# Patient Record
Sex: FEMALE | Race: WHITE | URBAN - METROPOLITAN AREA
[De-identification: names, ages, dates, MRNs, and addresses within clinical notes are randomized per-mention and may not be internally consistent; named-entity substitution may affect disease eponyms.]

---

## 2020-03-03 ENCOUNTER — OFFICE VISIT (OUTPATIENT)
Dept: URBAN - METROPOLITAN AREA CLINIC 71 | Facility: CLINIC | Age: 80
End: 2020-03-03
Payer: MEDICARE

## 2020-03-03 PROCEDURE — 99213 OFFICE O/P EST LOW 20 MIN: CPT | Performed by: OPHTHALMOLOGY

## 2020-03-03 PROCEDURE — 92134 CPTRZ OPH DX IMG PST SGM RTA: CPT | Performed by: OPHTHALMOLOGY

## 2020-03-03 ASSESSMENT — VISUAL ACUITY
OD: 20/30
OS: 20/30

## 2020-03-03 ASSESSMENT — INTRAOCULAR PRESSURE
OD: 17
OS: 16

## 2020-03-03 NOTE — IMPRESSION/PLAN
Impression: Nexdtve age-related mclr degn, bilateral, early dry stage: H35.3131.  Plan: con to use eye vitamins

## 2020-03-03 NOTE — IMPRESSION/PLAN
Impression: Age-related nuclear cataract, bilateral: H25.13. Plan: early signs of cataracts- has not changed much will con to monitor will r/c in 6 mons make sure vision does not drop off. pt would like to wait as well.

## 2020-09-03 ENCOUNTER — OFFICE VISIT (OUTPATIENT)
Dept: URBAN - METROPOLITAN AREA CLINIC 71 | Facility: CLINIC | Age: 80
End: 2020-09-03
Payer: MEDICARE

## 2020-09-03 DIAGNOSIS — H25.13 AGE-RELATED NUCLEAR CATARACT, BILATERAL: ICD-10-CM

## 2020-09-03 PROCEDURE — 92133 CPTRZD OPH DX IMG PST SGM ON: CPT | Performed by: OPHTHALMOLOGY

## 2020-09-03 PROCEDURE — 92014 COMPRE OPH EXAM EST PT 1/>: CPT | Performed by: OPHTHALMOLOGY

## 2020-09-03 PROCEDURE — 92134 CPTRZ OPH DX IMG PST SGM RTA: CPT | Performed by: OPHTHALMOLOGY

## 2020-09-03 ASSESSMENT — KERATOMETRY
OS: 43.63
OD: 42.88

## 2020-09-03 ASSESSMENT — INTRAOCULAR PRESSURE
OD: 13
OS: 12

## 2020-09-03 NOTE — IMPRESSION/PLAN
Impression: Nexdtve age-related mclr degn, bilateral, early dry stage: H35.3131. OCT ordered. Stable. Plan: No signs of leakage. The clinical exam and OCT are stable without evidence of progression. Will continue to monitor on a 6 months basis.

## 2020-09-03 NOTE — IMPRESSION/PLAN
Impression: Age-related nuclear cataract, bilateral: H25.13. Stable. Plan: No treatment currently recommended. The patient will monitor vision changes and contact us with any decrease in vision.

## 2021-03-23 ENCOUNTER — OFFICE VISIT (OUTPATIENT)
Dept: URBAN - METROPOLITAN AREA CLINIC 71 | Facility: CLINIC | Age: 81
End: 2021-03-23
Payer: MEDICARE

## 2021-03-23 DIAGNOSIS — H35.3131 NEXDTVE AGE-RELATED MCLR DEGN, BILATERAL, EARLY DRY STAGE: Primary | ICD-10-CM

## 2021-03-23 PROCEDURE — 99213 OFFICE O/P EST LOW 20 MIN: CPT | Performed by: OPHTHALMOLOGY

## 2021-03-23 PROCEDURE — 92134 CPTRZ OPH DX IMG PST SGM RTA: CPT | Performed by: OPHTHALMOLOGY

## 2021-03-23 ASSESSMENT — INTRAOCULAR PRESSURE
OS: 17
OD: 15

## 2021-03-23 NOTE — IMPRESSION/PLAN
Impression: Vitreous degeneration, bilateral: H43.813. Stable. No holes or tears seen today. Plan: Continue to monitor.

## 2021-03-23 NOTE — IMPRESSION/PLAN
Impression: Nexdtve age-related mclr degn, bilateral, early dry stage: H35.3131. OCT ordered today and reviewed. Plan: The clinical exam and OCT are stable without evidence of progression. The patient was advised to continue AREDs multivitamins. The patient was also advised to continue to monitor the Amsler grid, and call us immediately with any visual changes. Return in 6 months for dilation and OCT testing.

## 2021-07-15 ENCOUNTER — OFFICE VISIT (OUTPATIENT)
Dept: URBAN - METROPOLITAN AREA CLINIC 71 | Facility: CLINIC | Age: 81
End: 2021-07-15
Payer: COMMERCIAL

## 2021-07-15 DIAGNOSIS — H52.4 PRESBYOPIA: Primary | ICD-10-CM

## 2021-07-15 PROCEDURE — 92014 COMPRE OPH EXAM EST PT 1/>: CPT | Performed by: OPTOMETRIST

## 2021-07-15 ASSESSMENT — INTRAOCULAR PRESSURE
OS: 15
OD: 15

## 2021-07-15 ASSESSMENT — VISUAL ACUITY
OS: 20/40
OD: 20/40

## 2021-07-15 NOTE — IMPRESSION/PLAN
Impression: Presbyopia: H52.4. Plan: Presbyopia is the inability to focus on objects (ie: accommodate) due to the loss of flexibility of your natural lens. Presbyopia occurs with age. Reading glasses, bifocals, trifocals or contacts can be helpful. Contact the office if difficulty focusing persists despite corrective eye wear. No recommendation for glasses today. Updating the RX does not improve her vision better than 20/40 which is what she is seeing without correction today. Continue using the OTC readers and Follow with Dr. Onesimo Dean as planned.

## 2021-09-21 ENCOUNTER — OFFICE VISIT (OUTPATIENT)
Dept: URBAN - METROPOLITAN AREA CLINIC 71 | Facility: CLINIC | Age: 81
End: 2021-09-21
Payer: MEDICARE

## 2021-09-21 DIAGNOSIS — H43.813 VITREOUS DEGENERATION, BILATERAL: ICD-10-CM

## 2021-09-21 PROCEDURE — 92134 CPTRZ OPH DX IMG PST SGM RTA: CPT | Performed by: OPHTHALMOLOGY

## 2021-09-21 PROCEDURE — 99213 OFFICE O/P EST LOW 20 MIN: CPT | Performed by: OPHTHALMOLOGY

## 2021-09-21 ASSESSMENT — INTRAOCULAR PRESSURE
OS: 20
OD: 20

## 2021-09-21 ASSESSMENT — KERATOMETRY
OS: 43.63
OD: 43.13

## 2021-09-21 NOTE — IMPRESSION/PLAN
Impression: Dry eye syndrome of bilateral lacrimal glands: H04.123. Discussed diagnosis with patient. Plan: Recommend use of artificial tears as needed, especially while reading or on the computer. Patient is okay to continue use of Zaditor as needed for allergy symptoms.

## 2021-09-21 NOTE — IMPRESSION/PLAN
Impression: Nexdtve age-related mclr degn, bilateral, early dry stage: H35.3131. OCT ordered today and reviewed. No leakage or fluid seen today. Stable. Plan: The clinical exam and OCT are stable without evidence of progression. The patient was advised to continue AREDs multivitamins. The patient was also advised to continue to monitor the Amsler grid, and call us immediately with any visual changes. Return in 6 months for dilation and OCT testing.

## 2022-03-22 ENCOUNTER — OFFICE VISIT (OUTPATIENT)
Dept: URBAN - METROPOLITAN AREA CLINIC 71 | Facility: CLINIC | Age: 82
End: 2022-03-22
Payer: MEDICARE

## 2022-03-22 DIAGNOSIS — H35.3132 BILATERAL NONEXUDATIVE AGE-RELATED MACULAR DEGENERATION, INTERMEDIATE DRY STAGE: Primary | ICD-10-CM

## 2022-03-22 DIAGNOSIS — H04.123 DRY EYE SYNDROME OF BILATERAL LACRIMAL GLANDS: ICD-10-CM

## 2022-03-22 PROCEDURE — 99213 OFFICE O/P EST LOW 20 MIN: CPT | Performed by: OPHTHALMOLOGY

## 2022-03-22 PROCEDURE — 92134 CPTRZ OPH DX IMG PST SGM RTA: CPT | Performed by: OPHTHALMOLOGY

## 2022-03-22 ASSESSMENT — VISUAL ACUITY
OS: 20/40
OD: 20/40

## 2022-03-22 ASSESSMENT — INTRAOCULAR PRESSURE
OD: 19
OS: 15

## 2022-03-22 NOTE — IMPRESSION/PLAN
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 OCT ordered today and reviewed. No leakage seen today, but there is progression. Discussed with patient. Plan: Recommend use of ocular vitamins and monitoring of the amsler grid. Patient to call if any changes in vision or distortions occur. Return in 6 months for dilation and repeat OCT testing. Due to the progression of AMD, we will refer patient to a retinal specialist for evaluation.

## 2022-03-22 NOTE — IMPRESSION/PLAN
Impression: Dry eye syndrome of bilateral lacrimal glands: H04.123. Discussed diagnosis with patient. Plan: Recommend use of artificial tears as needed, especially while reading or on the computer.

## 2022-04-08 ENCOUNTER — OFFICE VISIT (OUTPATIENT)
Dept: URBAN - METROPOLITAN AREA CLINIC 70 | Facility: CLINIC | Age: 82
End: 2022-04-08
Payer: MEDICARE

## 2022-04-08 PROCEDURE — 99204 OFFICE O/P NEW MOD 45 MIN: CPT | Performed by: OPHTHALMOLOGY

## 2022-04-08 PROCEDURE — 92134 CPTRZ OPH DX IMG PST SGM RTA: CPT | Performed by: OPHTHALMOLOGY

## 2022-04-08 PROCEDURE — 92242 FLUORESCEIN&ICG ANGIOGRAPHY: CPT | Performed by: OPHTHALMOLOGY

## 2022-04-08 ASSESSMENT — INTRAOCULAR PRESSURE
OD: 17
OS: 15

## 2022-04-08 NOTE — IMPRESSION/PLAN
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 Plan: Exam, OCT, and ICG/FA reveal dry AMD. Discussed eye vitamins. Recommend AREDS-II, BID. Discussed blood pressure and tobacco usage.

## 2022-04-08 NOTE — IMPRESSION/PLAN
Impression: Age-related nuclear cataract, bilateral: H25.13.  Plan: okay to proceed with Cataract removal

## 2022-09-26 ENCOUNTER — OFFICE VISIT (OUTPATIENT)
Dept: URBAN - METROPOLITAN AREA CLINIC 71 | Facility: CLINIC | Age: 82
End: 2022-09-26
Payer: MEDICARE

## 2022-09-26 DIAGNOSIS — H35.3132 BILATERAL NONEXUDATIVE AGE-RELATED MACULAR DEGENERATION, INTERMEDIATE DRY STAGE: Primary | ICD-10-CM

## 2022-09-26 DIAGNOSIS — H25.13 AGE-RELATED NUCLEAR CATARACT, BILATERAL: ICD-10-CM

## 2022-09-26 DIAGNOSIS — H43.813 VITREOUS DEGENERATION, BILATERAL: ICD-10-CM

## 2022-09-26 PROCEDURE — 99213 OFFICE O/P EST LOW 20 MIN: CPT | Performed by: OPHTHALMOLOGY

## 2022-09-26 PROCEDURE — 92134 CPTRZ OPH DX IMG PST SGM RTA: CPT | Performed by: OPHTHALMOLOGY

## 2022-09-26 ASSESSMENT — INTRAOCULAR PRESSURE
OS: 12
OD: 10

## 2022-09-26 NOTE — IMPRESSION/PLAN
Impression: Age-related nuclear cataract, bilateral: H25.13. Stable. Patient does feel as though her vision is bothered by the cataracts at this time. Plan: No treatment currently recommended. The patient will monitor vision changes and contact us with any decrease in vision.

## 2022-09-26 NOTE — IMPRESSION/PLAN
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 OCT ordered today and reviewed. OCT and physical exam are remaining stable with no signs of leakage. Plan: Recommend use of ocular vitamins and monitoring of the amsler grid. Patient to call if any changes in vision or distortions occur. Return in 6 months for dilation and repeat OCT testing. Keep appointments as scheduled with Dr. Chana Stark.

## 2022-10-07 ENCOUNTER — OFFICE VISIT (OUTPATIENT)
Dept: URBAN - METROPOLITAN AREA CLINIC 70 | Facility: CLINIC | Age: 82
End: 2022-10-07
Payer: MEDICARE

## 2022-10-07 DIAGNOSIS — H25.13 AGE-RELATED NUCLEAR CATARACT, BILATERAL: ICD-10-CM

## 2022-10-07 DIAGNOSIS — H35.3132 BILATERAL NONEXUDATIVE AGE-RELATED MACULAR DEGENERATION, INTERMEDIATE DRY STAGE: Primary | ICD-10-CM

## 2022-10-07 PROCEDURE — 92242 FLUORESCEIN&ICG ANGIOGRAPHY: CPT | Performed by: OPHTHALMOLOGY

## 2022-10-07 PROCEDURE — 99212 OFFICE O/P EST SF 10 MIN: CPT | Performed by: OPHTHALMOLOGY

## 2022-10-07 PROCEDURE — 92134 CPTRZ OPH DX IMG PST SGM RTA: CPT | Performed by: OPHTHALMOLOGY

## 2022-10-07 ASSESSMENT — INTRAOCULAR PRESSURE
OS: 13
OD: 13

## 2023-03-27 ENCOUNTER — OFFICE VISIT (OUTPATIENT)
Dept: URBAN - METROPOLITAN AREA CLINIC 71 | Facility: CLINIC | Age: 83
End: 2023-03-27
Payer: MEDICARE

## 2023-03-27 DIAGNOSIS — H43.813 VITREOUS DEGENERATION, BILATERAL: ICD-10-CM

## 2023-03-27 DIAGNOSIS — H25.813 COMBINED FORMS OF AGE-RELATED CATARACT, BILATERAL: ICD-10-CM

## 2023-03-27 DIAGNOSIS — H35.3132 NONEXUDATIVE AGE-RELATED MACULAR DEGENERATION, BILATERAL, INTERMEDIATE DRY STAGE: Primary | ICD-10-CM

## 2023-03-27 PROCEDURE — 92134 CPTRZ OPH DX IMG PST SGM RTA: CPT | Performed by: OPHTHALMOLOGY

## 2023-03-27 PROCEDURE — 99213 OFFICE O/P EST LOW 20 MIN: CPT | Performed by: OPHTHALMOLOGY

## 2023-03-27 ASSESSMENT — VISUAL ACUITY
OD: 20/50
OS: 20/60

## 2023-03-27 ASSESSMENT — INTRAOCULAR PRESSURE
OD: 15
OS: 14

## 2023-03-27 NOTE — IMPRESSION/PLAN
Impression: Vitreous degeneration, bilateral: H43.813. Stable. No holes or tears seen today. Retina flat OU. Plan: Continue to monitor.

## 2023-03-27 NOTE — IMPRESSION/PLAN
Impression: Combined forms of age-related cataract, bilateral: H25.813. Cataracts are contributing to the patient's complaints of decreased vision OU. PT advised if we do the left eye and the right eye is not bothering her she can hold off on CAT SX on the right eye. Plan:  Discussed all risks, benefits, procedures and recovery. Patient understands changing glasses will not improve vision. Patient desires to have surgery, recommend phacoemulsification with intraocular lens.  OS first

## 2023-03-27 NOTE — IMPRESSION/PLAN
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 OCT ordered today and reviewed. OCT and exam are remaining stable with no signs of leakage. Plan: Recommend continued use of ocular vitamins and monitoring of the amsler grid. Patient to call if any changes in vision or distortions occur. Return in 6 months for dilation and repeat OCT testing. Keep appointments as scheduled with Dr. Ricardo Pike.

## 2023-05-15 ENCOUNTER — PRE-OPERATIVE VISIT (OUTPATIENT)
Dept: URBAN - METROPOLITAN AREA CLINIC 71 | Facility: CLINIC | Age: 83
End: 2023-05-15
Payer: MEDICARE

## 2023-05-15 DIAGNOSIS — H25.813 COMBINED FORMS OF AGE-RELATED CATARACT, BILATERAL: Primary | ICD-10-CM

## 2023-06-20 ENCOUNTER — PRE-OPERATIVE VISIT (OUTPATIENT)
Dept: URBAN - METROPOLITAN AREA CLINIC 71 | Facility: CLINIC | Age: 83
End: 2023-06-20
Payer: MEDICARE

## 2023-06-20 DIAGNOSIS — H35.3132 BILATERAL NONEXUDATIVE AGE-RELATED MACULAR DEGENERATION, INTERMEDIATE DRY STAGE: ICD-10-CM

## 2023-06-20 DIAGNOSIS — H25.813 COMBINED FORMS OF AGE-RELATED CATARACT, BILATERAL: Primary | ICD-10-CM

## 2023-06-20 PROCEDURE — 76519 ECHO EXAM OF EYE: CPT | Performed by: OPHTHALMOLOGY

## 2023-06-20 PROCEDURE — 99213 OFFICE O/P EST LOW 20 MIN: CPT | Performed by: OPHTHALMOLOGY

## 2023-06-20 RX ORDER — KETOROLAC TROMETHAMINE 5 MG/ML
0.5 % SOLUTION OPHTHALMIC
Qty: 5 | Refills: 0 | Status: ACTIVE
Start: 2023-06-20

## 2023-06-20 ASSESSMENT — VISUAL ACUITY
OS: 20/60
OD: 20/50

## 2023-06-20 ASSESSMENT — INTRAOCULAR PRESSURE
OD: 20
OS: 16

## 2023-06-20 NOTE — IMPRESSION/PLAN
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 OPTOS ordered and reviewed today. No leakage noted on imaging. Plan: PT warned that the AMD may limit the visual outcome of the cataract surgery OS. Continue to monitor.

## 2023-06-20 NOTE — IMPRESSION/PLAN
Impression: Combined forms of age-related cataract, bilateral: H25.813. Plan: Consent sheet was reviewed with the patient. Reviewed ascan, itrace, OCT and OPTOS. Heart and lungs clear. R/B/A discussed. Proceed with [facility] with ORA cataract surgery with dextenza. OS only for distance. Patient voices understanding that cataract surgery is not a guarantee for 20/20 vision and that glasses may be needed after the surgery. PT warned the AMD may limit the visual outcome of the cataract surgery. Retinal clearance needed per Dr. Onesimo Dean. Start ketorolac BID for 3 weeks starting the day prior to surgery on the surgical eye.

## 2023-06-23 ENCOUNTER — OFFICE VISIT (OUTPATIENT)
Dept: URBAN - METROPOLITAN AREA CLINIC 71 | Facility: CLINIC | Age: 83
End: 2023-06-23
Payer: MEDICARE

## 2023-06-23 PROCEDURE — 99214 OFFICE O/P EST MOD 30 MIN: CPT | Performed by: OPHTHALMOLOGY

## 2023-06-23 PROCEDURE — 92250 FUNDUS PHOTOGRAPHY W/I&R: CPT | Performed by: OPHTHALMOLOGY

## 2023-06-23 PROCEDURE — 92134 CPTRZ OPH DX IMG PST SGM RTA: CPT | Performed by: OPHTHALMOLOGY

## 2023-06-23 ASSESSMENT — INTRAOCULAR PRESSURE
OS: 13
OD: 16

## 2023-06-23 NOTE — IMPRESSION/PLAN
Impression: Nonexudative age-related macular degeneration, bilateral, intermediate dry stage: H35.3132. Plan: Subfoveal PED w/ RPE hyperplasia OU - observe, RTC 6 months DFEx/OCT mac/FP/FAF. AREDS 2 and Amsler grid checks.

## 2023-06-23 NOTE — IMPRESSION/PLAN
Impression: Nonexudative age-related macular degeneration, bilateral, intermediate dry stage: H35.3132. Plan: Stable, observe; bilateral PEDs w/ pigmentary migration w/out CNV formation - RTC 4 months DFEx/OCT mac/FP/FAF. AREDS 2 and Amsler grid checks.

## 2023-07-13 ENCOUNTER — SURGERY (OUTPATIENT)
Dept: URBAN - METROPOLITAN AREA SURGERY 44 | Facility: SURGERY | Age: 83
End: 2023-07-13
Payer: MEDICARE

## 2023-07-13 ENCOUNTER — SURGERY (OUTPATIENT)
Dept: URBAN - METROPOLITAN AREA SURGERY 45 | Facility: SURGERY | Age: 83
End: 2023-07-13
Payer: MEDICARE

## 2023-07-13 PROCEDURE — PR1CP PR1CP: CUSTOM | Performed by: OPHTHALMOLOGY

## 2023-07-13 PROCEDURE — 66984 XCAPSL CTRC RMVL W/O ECP: CPT | Performed by: OPHTHALMOLOGY

## 2023-07-14 ENCOUNTER — POST-OPERATIVE VISIT (OUTPATIENT)
Dept: URBAN - METROPOLITAN AREA CLINIC 71 | Facility: CLINIC | Age: 83
End: 2023-07-14
Payer: MEDICARE

## 2023-07-14 DIAGNOSIS — Z48.810 ENCOUNTER FOR SURGICAL AFTERCARE FOLLOWING SURGERY ON A SENSE ORGAN: Primary | ICD-10-CM

## 2023-07-14 PROCEDURE — 99024 POSTOP FOLLOW-UP VISIT: CPT | Performed by: OPHTHALMOLOGY

## 2023-07-14 ASSESSMENT — INTRAOCULAR PRESSURE
OD: 13
OS: 16

## 2023-07-28 ENCOUNTER — POST-OPERATIVE VISIT (OUTPATIENT)
Dept: URBAN - METROPOLITAN AREA CLINIC 71 | Facility: CLINIC | Age: 83
End: 2023-07-28
Payer: MEDICARE

## 2023-07-28 DIAGNOSIS — Z48.810 ENCOUNTER FOR SURGICAL AFTERCARE FOLLOWING SURGERY ON A SENSE ORGAN: Primary | ICD-10-CM

## 2023-07-28 PROCEDURE — 99024 POSTOP FOLLOW-UP VISIT: CPT | Performed by: OPHTHALMOLOGY

## 2023-07-28 ASSESSMENT — INTRAOCULAR PRESSURE
OD: 16
OS: 13

## 2023-08-16 ENCOUNTER — OFFICE VISIT (OUTPATIENT)
Dept: URBAN - METROPOLITAN AREA CLINIC 71 | Facility: CLINIC | Age: 83
End: 2023-08-16
Payer: COMMERCIAL

## 2023-08-16 DIAGNOSIS — H52.4 PRESBYOPIA: ICD-10-CM

## 2023-08-16 DIAGNOSIS — H52.223 REGULAR ASTIGMATISM, BILATERAL: Primary | ICD-10-CM

## 2023-08-16 PROCEDURE — 92012 INTRM OPH EXAM EST PATIENT: CPT | Performed by: OPTOMETRIST

## 2023-08-16 ASSESSMENT — VISUAL ACUITY
OD: 20/50
OS: 20/50

## 2023-08-16 ASSESSMENT — INTRAOCULAR PRESSURE
OS: 8
OD: 10

## 2024-05-22 ENCOUNTER — OFFICE VISIT (OUTPATIENT)
Dept: URBAN - METROPOLITAN AREA CLINIC 71 | Facility: CLINIC | Age: 84
End: 2024-05-22
Payer: MEDICARE

## 2024-05-22 DIAGNOSIS — H25.11 AGE-RELATED NUCLEAR CATARACT, RIGHT EYE: ICD-10-CM

## 2024-05-22 DIAGNOSIS — H52.4 PRESBYOPIA: ICD-10-CM

## 2024-05-22 DIAGNOSIS — H35.3132 NONEXUDATIVE AGE-RELATED MACULAR DEGENERATION, BILATERAL, INTERMEDIATE DRY STAGE: Primary | ICD-10-CM

## 2024-05-22 DIAGNOSIS — H43.812 VITREOUS DEGENERATION, LEFT EYE: ICD-10-CM

## 2024-05-22 DIAGNOSIS — Z96.1 PRESENCE OF INTRAOCULAR LENS: ICD-10-CM

## 2024-05-22 PROCEDURE — 92133 CPTRZD OPH DX IMG PST SGM ON: CPT | Performed by: OPTOMETRIST

## 2024-05-22 PROCEDURE — 92134 CPTRZ OPH DX IMG PST SGM RTA: CPT | Performed by: OPTOMETRIST

## 2024-05-22 PROCEDURE — 99213 OFFICE O/P EST LOW 20 MIN: CPT | Performed by: OPTOMETRIST

## 2024-05-22 ASSESSMENT — INTRAOCULAR PRESSURE
OD: 15
OS: 9

## 2024-05-22 ASSESSMENT — VISUAL ACUITY
OS: 20/40
OD: 20/50

## 2024-07-12 ENCOUNTER — OFFICE VISIT (OUTPATIENT)
Dept: URBAN - METROPOLITAN AREA CLINIC 71 | Facility: CLINIC | Age: 84
End: 2024-07-12
Payer: MEDICARE

## 2024-07-12 DIAGNOSIS — H35.3132 NONEXUDATIVE AGE-RELATED MACULAR DEGENERATION, BILATERAL, INTERMEDIATE DRY STAGE: Primary | ICD-10-CM

## 2024-07-12 PROCEDURE — 92250 FUNDUS PHOTOGRAPHY W/I&R: CPT | Performed by: OPHTHALMOLOGY

## 2024-07-12 PROCEDURE — 99214 OFFICE O/P EST MOD 30 MIN: CPT | Performed by: OPHTHALMOLOGY

## 2024-07-12 PROCEDURE — 92134 CPTRZ OPH DX IMG PST SGM RTA: CPT | Performed by: OPHTHALMOLOGY

## 2024-07-12 ASSESSMENT — INTRAOCULAR PRESSURE
OD: 22
OS: 15

## 2025-02-18 ENCOUNTER — OFFICE VISIT (OUTPATIENT)
Dept: URBAN - METROPOLITAN AREA CLINIC 71 | Facility: CLINIC | Age: 85
End: 2025-02-18
Payer: MEDICARE

## 2025-02-18 DIAGNOSIS — Z96.1 PRESENCE OF INTRAOCULAR LENS: ICD-10-CM

## 2025-02-18 DIAGNOSIS — H35.3132 NONEXUDATIVE AGE-RELATED MACULAR DEGENERATION, BILATERAL, INTERMEDIATE DRY STAGE: Primary | ICD-10-CM

## 2025-02-18 DIAGNOSIS — H25.11 AGE-RELATED NUCLEAR CATARACT, RIGHT EYE: ICD-10-CM

## 2025-02-18 DIAGNOSIS — H43.813 VITREOUS DEGENERATION, BILATERAL: ICD-10-CM

## 2025-02-18 PROCEDURE — 99214 OFFICE O/P EST MOD 30 MIN: CPT | Performed by: OPHTHALMOLOGY

## 2025-02-18 PROCEDURE — 92134 CPTRZ OPH DX IMG PST SGM RTA: CPT | Performed by: OPHTHALMOLOGY

## 2025-02-18 ASSESSMENT — INTRAOCULAR PRESSURE
OS: 15
OD: 19

## 2025-03-24 ENCOUNTER — OFFICE VISIT (OUTPATIENT)
Dept: URBAN - METROPOLITAN AREA CLINIC 71 | Facility: CLINIC | Age: 85
End: 2025-03-24
Payer: COMMERCIAL

## 2025-03-24 DIAGNOSIS — H25.11 AGE-RELATED NUCLEAR CATARACT, RIGHT EYE: ICD-10-CM

## 2025-03-24 DIAGNOSIS — H35.3132 NONEXUDATIVE AGE-RELATED MACULAR DEGENERATION, BILATERAL, INTERMEDIATE DRY STAGE: ICD-10-CM

## 2025-03-24 DIAGNOSIS — Z96.1 PRESENCE OF INTRAOCULAR LENS: ICD-10-CM

## 2025-03-24 DIAGNOSIS — H43.813 VITREOUS DEGENERATION, BILATERAL: ICD-10-CM

## 2025-03-24 DIAGNOSIS — H52.4 PRESBYOPIA: Primary | ICD-10-CM

## 2025-03-24 PROCEDURE — 92012 INTRM OPH EXAM EST PATIENT: CPT

## 2025-03-24 ASSESSMENT — INTRAOCULAR PRESSURE
OS: 11
OD: 18

## 2025-03-24 ASSESSMENT — VISUAL ACUITY
OS: 20/25
OD: 20/90

## 2025-05-20 ENCOUNTER — OFFICE VISIT (OUTPATIENT)
Facility: LOCATION | Age: 85
End: 2025-05-20
Payer: MEDICARE

## 2025-05-20 DIAGNOSIS — H25.11 AGE-RELATED NUCLEAR CATARACT, RIGHT EYE: ICD-10-CM

## 2025-05-20 DIAGNOSIS — Z96.1 PRESENCE OF INTRAOCULAR LENS: ICD-10-CM

## 2025-05-20 DIAGNOSIS — H35.3133 NONEXUDATIVE MACULAR DEGENERATION, ADVANCED ATROPHIC WITHOUT SUBFOVEAL INVOLVEMENT, BILATERAL: Primary | ICD-10-CM

## 2025-05-20 DIAGNOSIS — H43.813 VITREOUS DEGENERATION, BILATERAL: ICD-10-CM

## 2025-05-20 PROCEDURE — 92134 CPTRZ OPH DX IMG PST SGM RTA: CPT | Performed by: STUDENT IN AN ORGANIZED HEALTH CARE EDUCATION/TRAINING PROGRAM

## 2025-05-20 PROCEDURE — 99204 OFFICE O/P NEW MOD 45 MIN: CPT | Performed by: STUDENT IN AN ORGANIZED HEALTH CARE EDUCATION/TRAINING PROGRAM

## 2025-05-20 ASSESSMENT — INTRAOCULAR PRESSURE
OS: 20
OD: 27

## 2025-08-19 ENCOUNTER — OFFICE VISIT (OUTPATIENT)
Dept: URBAN - METROPOLITAN AREA CLINIC 71 | Facility: CLINIC | Age: 85
End: 2025-08-19
Payer: MEDICARE

## 2025-08-19 DIAGNOSIS — H35.3133 NONEXUDATIVE MACULAR DEGENERATION, ADVANCED ATROPHIC WITHOUT SUBFOVEAL INVOLVEMENT, BILATERAL: ICD-10-CM

## 2025-08-19 DIAGNOSIS — H25.811 COMBINED FORMS OF AGE-RELATED CATARACT, RIGHT EYE: Primary | ICD-10-CM

## 2025-08-19 DIAGNOSIS — H43.813 VITREOUS DEGENERATION, BILATERAL: ICD-10-CM

## 2025-08-19 DIAGNOSIS — Z96.1 PRESENCE OF INTRAOCULAR LENS: ICD-10-CM

## 2025-08-19 DIAGNOSIS — H40.013 OPEN ANGLE WITH BORDERLINE FINDINGS, LOW RISK, BILATERAL: ICD-10-CM

## 2025-08-19 PROCEDURE — 92133 CPTRZD OPH DX IMG PST SGM ON: CPT | Performed by: OPHTHALMOLOGY

## 2025-08-19 PROCEDURE — 92134 CPTRZ OPH DX IMG PST SGM RTA: CPT | Performed by: OPHTHALMOLOGY

## 2025-08-19 PROCEDURE — 99213 OFFICE O/P EST LOW 20 MIN: CPT | Performed by: OPHTHALMOLOGY

## 2025-08-19 ASSESSMENT — INTRAOCULAR PRESSURE
OS: 19
OS: 13
OD: 24

## 2025-08-19 ASSESSMENT — VISUAL ACUITY
OD: 20/60
OS: 20/50